# Patient Record
(demographics unavailable — no encounter records)

---

## 2024-11-19 NOTE — HEALTH RISK ASSESSMENT
[Good] : ~his/her~  mood as  good [No falls in past year] : Patient reported no falls in the past year [0] : 2) Feeling down, depressed, or hopeless: Not at all (0) [Patient reported PAP Smear was normal] : Patient reported PAP Smear was normal [PapSmearDate] : 10/24

## 2025-05-16 NOTE — PLAN
[FreeTextEntry1] : 39 year old female presents for routine gyn exam   BSE taught Breast and pelvic exam performed Pap/HPV conducted  h./o abn pap - pt to forward GYN records  Contraception Liletta in situ - placed 2022  Vaginal discharge f/u vaginitis swab and genital cx rx sent for diflucan  RTO in 1 year or PRN

## 2025-05-16 NOTE — PHYSICAL EXAM
[Soft] : soft [Non-tender] : non-tender [Non-distended] : non-distended [Examination Of The Breasts] : a normal appearance [No Masses] : no breast masses were palpable [Labia Majora] : normal [Labia Minora] : normal [IUD String] : an IUD string was noted [Normal] : normal [Uterine Adnexae] : normal [FreeTextEntry4] : thick white d/c noted in vault

## 2025-05-16 NOTE — HISTORY OF PRESENT ILLNESS
[FreeTextEntry1] :  2025. DEL LIU 39-year-old female  menses irregular secondary to Liletta placement  presents for initial annual visit. c/o foul smelling vaginal discharge with intermittent itching x 1  month Currently sexually active with 1 partner  Reports irregular menses secondary to IUD.   No urinary complaints. She has normal BM, no bloody stool. She denies abdominal or pelvic pain.  Denies changes in medical status, medications, serious illness, hospitalizations, and surgeries.  OBHx:     c/s di/di gestation via IUI complicated by PPROM -   GynHx: +h/o abn pap with HPV s/p neg colpo most recently 10/2024 - states "I need a colposcopy" No Hx of STI, fibroids, ovarian cysts, or pelvic infections.  PMH:  seasonal allergies  SHx: c/s x 1  Meds: triamcinolone metformin Claritin   All: NKDA   Soc: Social ETOH. No drug, or tobacco use, non-smoker.   FHx: paternal grandmother with Breast Ca in 90s and paternal aunt with Breast Ca in 50s - aunt is BRCA neg Denies FHx of ovarian, uterine or colon cancer. Pt reports she is BRCA neg